# Patient Record
Sex: FEMALE | Race: WHITE
[De-identification: names, ages, dates, MRNs, and addresses within clinical notes are randomized per-mention and may not be internally consistent; named-entity substitution may affect disease eponyms.]

---

## 2018-03-21 ENCOUNTER — HOSPITAL ENCOUNTER (OUTPATIENT)
Dept: HOSPITAL 80 - FIMAGING | Age: 48
End: 2018-03-21
Attending: SURGERY
Payer: COMMERCIAL

## 2018-03-21 DIAGNOSIS — K82.8: ICD-10-CM

## 2018-03-21 DIAGNOSIS — R10.11: Primary | ICD-10-CM

## 2018-03-21 DIAGNOSIS — R11.0: ICD-10-CM

## 2018-03-21 PROCEDURE — 78227 HEPATOBIL SYST IMAGE W/DRUG: CPT

## 2018-03-21 PROCEDURE — A9537 TC99M MEBROFENIN: HCPCS

## 2018-04-06 ENCOUNTER — HOSPITAL ENCOUNTER (OUTPATIENT)
Dept: HOSPITAL 80 - FSGY | Age: 48
Discharge: HOME | End: 2018-04-06
Attending: SURGERY
Payer: COMMERCIAL

## 2018-04-06 VITALS — DIASTOLIC BLOOD PRESSURE: 66 MMHG | SYSTOLIC BLOOD PRESSURE: 105 MMHG

## 2018-04-06 DIAGNOSIS — K81.9: Primary | ICD-10-CM

## 2018-04-06 PROCEDURE — 0FT44ZZ RESECTION OF GALLBLADDER, PERCUTANEOUS ENDOSCOPIC APPROACH: ICD-10-PCS | Performed by: SURGERY

## 2018-04-06 RX ADMIN — Medication PRN MCG: at 11:36

## 2018-04-06 RX ADMIN — Medication PRN MCG: at 11:46

## 2018-04-06 NOTE — PDANEPAE
ANE History of Present Illness





Cholelithiasis





ANE Past Medical History





- Cardiovascular History


Hx Hypertension: No


Hx Arrhythmias: No


Hx Chest Pain: No


Hx Coronary Artery / Peripheral Vascular Disease: No


Hx CHF / Valvular Disease: No


Hx Palpitations: No





- Pulmonary History


Hx COPD: No


Hx Asthma/Reactive Airway Disease: No


Hx Recent Upper Respiratory Infection: No


Hx Oxygen in Use at Home: No


Hx Sleep Apnea: No


Sleep Apnea Screening Result - Last Documented: Negative





- Neurologic History


Hx Cerebrovascular Accident: No


Hx Seizures: No


Hx Dementia: No





- Endocrine History


Hx Diabetes: No





- Renal History


Hx Renal Disorders: No





- Liver History


Hx Hepatic Disorders: No





- Neurological & Psychiatric Hx


Hx Neurological and Psychiatric Disorders: No





- Cancer History


Hx Cancer: No





- Congenital Disorder History


Hx Congenital Disorders: No





- GI History


Hx Gastrointestinal Disorders: Yes


Gastrointestinal History Comment: fern nissen 01/02/18 with Vladimir





- Other Health History


Other Health History: wears glasses





- Chronic Pain History


Chronic Pain: No





- Surgical History


Prior Surgeries: fern nissen with Gilbert 01/02/18





ANE Review of Systems


Review of Systems: 








- Exercise capacity


METS (RN): 4 METS





ANE Patient History





- Allergies


Allergies/Adverse Reactions: 








No Known Allergies Allergy (Verified 04/04/18 14:12)


 








- Home Medications


Home medications: home medication list seen and reviewed


Home Medications: 








LEVOTHYROXINE SODIUM [Tirosint 88 mcg]  12/29/17 [Last Taken 04/06/18]


Loratadine [Claritin]  12/29/17 [Last Taken 04/05/18]


Omeprazole  12/29/17 [Last Taken 04/05/18]


Calcium  04/04/18 [Last Taken 04/06/18]








- NPO status


NPO Since - Liquids (Date): 04/06/18


NPO Since - Liquids (Time): 05:30


NPO Since - Solids (Date): 04/05/18


NPO Since - Solids (Time): 21:00





- Smoking Hx


Smoking Status: Never smoked





- Family Anes Hx


Family Hx Anesthesia Complications: none





ANE Labs/Vital Signs





- Vital Signs


Blood Pressure: 102/51


Heart Rate: 69


Respiratory Rate: 16


O2 Sat (%): 99


Height: 167.64 cm


Weight: 57.606 kg





ANE Physical Exam





- Airway


Neck exam: FROM


Mallampati Score: Class 1


Mouth exam: normal dental/mouth exam





- Pulmonary


Pulmonary: clear to auscultation





- Cardiovascular


Cardiovascular: regular rate and rhythym





- ASA Status


ASA Status: II

## 2018-04-06 NOTE — POSTOPPROG
Post Op Note


Date of Operation: 04/06/18


Surgeon: Rupert Gilbert


Assistant: Mustapha


Anesthesiologist: Binu


Anesthesia: GET(General Endotracheal)


Pre-op Diagnosis: Cholecystitis


Post-op Diagnosis: same


Indication: RUQ pain


Procedure: lap nicho


Inf/Abcess present in the surg proc area at time of surgery?: No


Depth: Organ Space


EBL: Minimal


Specimen(s): 





Gall bladder

## 2018-04-11 NOTE — GOP
[f rep st]



                                                                OPERATIVE REPORT





DATE OF OPERATION:  



SURGEON:  Rupert Gilbert MD



ASSISTANT:  Concepción Mckeon, nurse practitioner.



ANESTHESIOLOGIST:  Dr. Flores.



PREOPERATIVE DIAGNOSIS:  Chronic cholecystitis.



POSTOPERATIVE DIAGNOSIS:  Chronic cholecystitis.



PROCEDURE PERFORMED:  Laparoscopic cholecystectomy.



FINDINGS:  The patient was found to have a noninflamed gallbladder with some mild thickening.  Ducts 
were small and normal.





ESTIMATED BLOOD LOSS:  Less than 25 cc.



DESCRIPTION OF PROCEDURE:  Patient taken to the operating room where she received satisfactory genera
l endotracheal anesthesia.  She was placed in the supine position, prepped and draped in the usual st
erile fashion.  A periumbilical incision was made.  A Veress needle inserted.  Pneumoperitoneum was e
stablished.  Trocar was introduced.  Laparoscope introduced.  Good visualization was obtained.  Three
 other trocars were placed in the upper abdomen under direct vision.  The gallbladder was elevated up
.  Adhesions were taken down.  The cystic triangle was carefully exposed.  The cystic artery and cyst
ic duct were isolated.  A good clear view was obtained, and both structures were multiply hemo-clippe
d and divided with care to avoid injury to the common bile duct.  The peritoneum of the gallbladder w
as incised.  The gallbladder was dissected free in the bed in the hepatic fossa and extracted through
 the upper midline port site.  Hemostasis was assured.  Trocars removed were under direct vision.  Tr
ocar sites were closed with 0 Vicryl for the fascia, 4-0 Monocryl subcuticular stitch for the skin.  
All layers infiltrated with 0.5% Marcaine.



COMPLICATIONS:  No complications.



Copy requested to:

Dr. Naveen Victoria



Job #:  [id number]